# Patient Record
Sex: MALE | Race: WHITE | Employment: FULL TIME | URBAN - METROPOLITAN AREA
[De-identification: names, ages, dates, MRNs, and addresses within clinical notes are randomized per-mention and may not be internally consistent; named-entity substitution may affect disease eponyms.]

---

## 2017-06-15 PROBLEM — G30.9 ALZHEIMER'S DEMENTIA WITHOUT BEHAVIORAL DISTURBANCE (HCC): Status: ACTIVE | Noted: 2017-06-15

## 2017-06-15 PROBLEM — F02.80 ALZHEIMER'S DEMENTIA WITHOUT BEHAVIORAL DISTURBANCE (HCC): Status: ACTIVE | Noted: 2017-06-15

## 2018-04-24 PROBLEM — E11.21 TYPE 2 DIABETES WITH NEPHROPATHY (HCC): Status: ACTIVE | Noted: 2018-04-24

## 2018-05-07 PROBLEM — D51.0 PERNICIOUS ANEMIA: Status: ACTIVE | Noted: 2018-05-07

## 2018-05-07 PROBLEM — Z79.899 ENCOUNTER FOR MEDICATION MANAGEMENT: Status: ACTIVE | Noted: 2018-05-07

## 2018-05-07 PROBLEM — R26.9 GAIT DISORDER: Status: ACTIVE | Noted: 2018-05-07

## 2018-05-07 PROBLEM — G20 PARKINSONISM (HCC): Status: ACTIVE | Noted: 2018-05-07

## 2018-05-07 PROBLEM — R29.3 POSTURAL IMBALANCE: Status: ACTIVE | Noted: 2018-05-07

## 2018-05-07 PROBLEM — F02.818 EARLY ONSET ALZHEIMER'S DISEASE WITH BEHAVIORAL DISTURBANCE (HCC): Status: ACTIVE | Noted: 2018-05-07

## 2018-05-07 PROBLEM — G30.0 EARLY ONSET ALZHEIMER'S DISEASE WITH BEHAVIORAL DISTURBANCE (HCC): Status: ACTIVE | Noted: 2018-05-07

## 2018-05-16 PROBLEM — E53.8 VITAMIN B12 DEFICIENCY: Status: ACTIVE | Noted: 2018-05-16

## 2018-05-21 ENCOUNTER — HOSPITAL ENCOUNTER (OUTPATIENT)
Dept: CT IMAGING | Age: 78
Discharge: HOME | End: 2018-05-21
Attending: PSYCHIATRY & NEUROLOGY
Payer: MEDICARE

## 2018-05-21 DIAGNOSIS — F02.818 EARLY ONSET ALZHEIMER'S DISEASE WITH BEHAVIORAL DISTURBANCE (HCC): ICD-10-CM

## 2018-05-21 DIAGNOSIS — G30.0 EARLY ONSET ALZHEIMER'S DISEASE WITH BEHAVIORAL DISTURBANCE (HCC): ICD-10-CM

## 2018-05-21 PROCEDURE — 70450 CT HEAD/BRAIN W/O DYE: CPT

## 2018-05-25 ENCOUNTER — HOSPITAL ENCOUNTER (OUTPATIENT)
Dept: PHYSICAL THERAPY | Age: 78
Discharge: HOME | End: 2018-05-25
Attending: PSYCHIATRY & NEUROLOGY
Payer: MEDICARE

## 2018-05-25 DIAGNOSIS — R26.9 GAIT DISORDER: ICD-10-CM

## 2018-05-25 PROCEDURE — 97110 THERAPEUTIC EXERCISES: CPT

## 2018-05-25 PROCEDURE — G8978 MOBILITY CURRENT STATUS: HCPCS

## 2018-05-25 PROCEDURE — G8979 MOBILITY GOAL STATUS: HCPCS

## 2018-05-25 PROCEDURE — 97163 PT EVAL HIGH COMPLEX 45 MIN: CPT

## 2018-05-25 NOTE — PROGRESS NOTES
Ambulatory/Rehab Services H2 Model Falls Risk Assessment    Risk Factor Pts. ·   Confusion/Disorientation/Impulsivity  []    4 ·   Symptomatic Depression  []   2 ·   Altered Elimination  []   1 ·   Dizziness/Vertigo  []   1 ·   Gender (Male)  [x]   1 ·   Any administered antiepileptics (anticonvulsants):  []   2 ·   Any administered benzodiazepines:  []   1 ·   Visual Impairment (specify):  []   1 ·   Portable Oxygen Use  []   1 ·   Orthostatic ? BP  []   1 ·   History of Recent Falls (within 3 mos.)  [x]   5     Ability to Rise from Chair (choose one) Pts. ·   Ability to rise in a single movement  [x]   0 ·   Pushes up, successful in one attempt  []   1 ·   Multiple attempts, but successful  []   3 ·   Unable to rise without assistance  []   4   Total: (5 or greater = High Risk) 6     Falls Prevention Plan:   []                Physical Limitations to Exercise (specify):   []                Mobility Assistance Device (type):   [x]                Exercise/Equipment Adaptation (specify): Patient will be supervised in the clinic at all times due to higher fall risk. ©2010 [facility] . [facility] Patent #[id number].  Federal Law prohibits the replication, distribution or use without written permission from [facility] [employer]

## 2018-05-25 NOTE — THERAPY EVALUATION
Arnie Santiago  : 1940  [place]  Secondary:  [facility] [place] [address], [place] [address].  [phone]   [phone]        OUTPATIENT PHYSICAL [place] Assessment 2018    ICD-10: Treatment Diagnosis: Difficulty in walking, not elsewhere classified (R26.2)  Precautions/Allergies:   Seroquel [quetiapine]   Fall Risk Score: 6 (? 5 = High Risk)  MD Orders: Evaluate and treat  MEDICAL/REFERRING DIAGNOSIS:  Gait disorder [R26.9]   DATE OF ONSET: Chronic   REFERRING PHYSICIAN: Aj Mueller MD  RETURN PHYSICIAN APPOINTMENT: unknown      INITIAL ASSESSMENT:  Mr. Fara Kirkland presents with imbalance, impaired posture, and difficulty walking. Patient is at higher fall risk based on history of falls and score received on Ceron Balance Scale. Patient would benefit from skilled physical therapy to address problems and goals. Thank you for this referral.      PROBLEM LIST (Impacting functional limitations):  1. Decreased ADL/Functional Activities  2. Decreased Transfer Abilities  3. Decreased Ambulation Ability/Technique  4. Decreased Balance  5. Decreased Activity Tolerance  6. Decreased Flexibility/Joint Mobility  7. Decreased [place] with Home Exercise Program INTERVENTIONS PLANNED:  1. Balance Exercise  2. Bed Mobility  3. Family Education  4. Gait Training  5. Home Exercise Program (HEP)  6. Manual Therapy  7. Neuromuscular Re-education/Strengthening  8. Range of Motion (ROM)  9. Therapeutic Activites  10. Therapeutic Exercise/Strengthening   TREATMENT PLAN:  Effective Dates: 2018 TO 2018 (90 days). Frequency/Duration: 2 times a week for 90 Days  GOALS: (Goals have been discussed and agreed upon with patient.)  Short-Term Functional Goals: Time Frame: 30 days  1. Patient will be able to perform home exercise program with assistance from his family.     2. Patient will increase score on Ceron Balance Scale to greater than or equal to 43/56 demonstrating improved balance and decreased fall risk with daily activities. Discharge Goals: Time Frame: 90 days   1. Patient will increase score on Ceron Balance Scale to greater than or equal to 46/56 demonstrating improved balance and decreased fall risk with daily activities. 2. Patient with ambulate with least restrictive assistive device greater than or equal to 200 feet across level and unlevel surfaces without loss of balance. 3. Patient and his family will report improved stability and improved mobility with daily activities. Rehabilitation Potential For Stated Goals: Guarded  Regarding [facility] therapy, I certify that the treatment plan above will be carried out by a therapist or under their direction. Thank you for this referral,  Jarad Matias PT     Referring Physician Signature: Jj Mena MD              Date                    The information in this section was collected on 5/25/2018 (except where otherwise noted). HISTORY:   History of Present Injury/Illness (Reason for Referral):  Patient has had one fall over the past couple of months. Patient complains of imbalance and difficulty walking. Patient currently using no assistive. Patient does have a rollator walker he uses at home. Patient has difficulty getting in and out of the bed and up and down from the chair due to imbalance. Patient is worse in the mornings. Patient reports current pain level as 0/10; dizziness as 0/10. Past Medical History/Comorbidities:   Mr. Keren Green  has a past medical history of Type II or unspecified type diabetes mellitus without mention of complication, not stated as uncontrolled (8/19/2015) and Unspecified hypothyroidism (8/19/2015). Mr. Keren Green  has a past surgical history that includes hx cataract removal.  Social History/Living Environment:     Lives with daughter and son-in-law. Prior Level of Function/Work/Activity:  Needs assistance with ADLs. Patient needs twenty-four hour monitoring for safety. Daughter reports she has a bed alarm and bed monitor for the patient. Patient has travel wheelchair and rollator walker. Dominant Side:         RIGHT  Personal Factors:          Sex:  male        Age:  76 y.o. Current Medications:       Current Outpatient Prescriptions:     cyanocobalamin (VITAMIN B12) 1,000 mcg/mL injection, 1 ml IM q week x 4 then 1 ml IM q month. Disp with 10 x 3 ml syringe with 1\" 23 g needle, Disp: 10 Vial, Rfl: 12    melatonin tab tablet, Take 10 mg by mouth nightly., Disp: , Rfl:     rivastigmine tartrate (EXELON) 4.5 mg capsule, 3 per day for memory, Disp: 270 Cap, Rfl: 12    traZODone (DESYREL) 50 mg tablet, 2-3 qhs for sleep, Disp: 270 Tab, Rfl: 12    levothyroxine (SYNTHROID) 75 mcg tablet, 1 qd  Indications: hypothyroidism, Disp: 90 Tab, Rfl: 9    triamterene-hydroCHLOROthiazide (MAXZIDE) 37.5-25 mg per tablet, 1 qd  Indications: hypertension, Disp: 90 Tab, Rfl: 9    metFORMIN ER (GLUCOPHAGE XR) 500 mg tablet, 1 bid  Indications: type 2 diabetes mellitus, Disp: 180 Tab, Rfl: 9    simvastatin (ZOCOR) 40 mg tablet, Take 1 Tab by mouth nightly. Indications: mixed hyperlipidemia, Disp: 90 Tab, Rfl: 9    benazepril (LOTENSIN) 40 mg tablet, Take 1 Tab by mouth daily. Indications: hypertension, Disp: 90 Tab, Rfl: 9    Aspirin, Buffered 81 mg tab, Take  by mouth., Disp: , Rfl:    Date Last Reviewed:  5/25/2018   Number of Personal Factors/Comorbidities that affect the Plan of Care: 3+: HIGH COMPLEXITY   EXAMINATION:   Observation/Orthostatic Postural Assessment:          Significant forward head/rounded shoulders posture. Patient stands with weight shifted anteriorly with upper trunk in flexed forward position. Functional Mobility:         Gait/Ambulation:  Patient ambulates without assistive device demonstrating decreased gait speed with shuffling gait pattern. Patient walks with head looking down at the ground. Strength:          Bilateral lower extremity 5/5. Sensation:         Not tested. Postural Control & Balance:  · Ceron Balance Scale:  40/56.   (A score less than 45/56 indicates high risk of falls)     · Dynamic Gait Index:  Not tested. · mPura Sensory Organization Test:  Not tested. Body Structures Involved:  1. Eyes and Ears  2. Muscles Body Functions Affected:  1. Neuromusculoskeletal Activities and Participation Affected:  1. General Tasks and Demands  2. Mobility  3. Interpersonal Interactions and Relationships  4. Community, Social and [place]   Number of elements (examined above) that affect the Plan of Care: 4+: HIGH COMPLEXITY   CLINICAL PRESENTATION:   Presentation: Evolving clinical presentation with unstable and unpredictable characteristics: HIGH COMPLEXITY   CLINICAL DECISION MAKING:   Outcome Measure: Tool Used: Ceron Balance Scale  Score:  Initial: 40/56 Most Recent: X/56 (Date: -- )   Interpretation of Score: Each section is scored on a 0-4 scale, 0 representing the patients inability to perform the task and 4 representing independence. The scores of each section are added together for a total score of 56. The higher the patients score, the more independent the patient is. Any score below 45 indicates increased risk for falls. Score 56 55-45 44-34 33-23 22-12 11-1 0   Modifier CH CI CJ CK CL CM CN     ? Mobility - Walking and Moving Around:     - CURRENT STATUS: CJ - 20%-39% impaired, limited or restricted    - GOAL STATUS: CI - 1%-19% impaired, limited or restricted    - D/C STATUS:  ---------------To be determined---------------    Medical Necessity:   · Patient is expected to demonstrate progress in balance and posture to improve safety during activities of daily living. Reason for Services/Other Comments:  · Patient continues to require skilled intervention due to higher fall risk with daily activities.    Use of outcome tool(s) and clinical judgement create a POC that gives a: Difficult prediction of patient's progress: HIGH COMPLEXITY            TREATMENT:   (In addition to Assessment/Re-Assessment sessions the following treatments were rendered)  Pre-treatment Symptoms/Complaints:  Imbalance and difficulty walking   Pain: Initial:   Pain Intensity 1: 0  Post Session:  0     Initial Evaluation: 45 minutes  THERAPEUTIC EXERCISE: (15 minutes):  Exercises per grid below to improve mobility and strength. Required minimal verbal cues to promote proper body alignment. Progressed repetitions as indicated. Date:  5/25/18 Date:   Date:     Activity/Exercise Parameters Parameters Parameters   Cervical rotation X 5B     Cervical extension X 5B     Cervical lateral flexion X 5B     Scapular retraction X 5B     Seated hip flexion X 5B     Seated hip abduction Red theraband x 5B     Seated leg press Red theraband x 5B       Patient given home exercise program consisting of cervical exercises, seated lower extremity exercises, and balance exercises. [employer]  Treatment/Session Assessment:    · Response to Treatment:  Tolerated without complaints. · Compliance with Program/Exercises: Will assess as treatment progresses. · Recommendations/Intent for next treatment session: \"Next visit will focus on advancements to more challenging activities\".   Total Treatment Duration:  PT Patient Time In/Time Out  Time In: 1100  Time Out: [facility]

## 2018-06-15 ENCOUNTER — HOSPITAL ENCOUNTER (OUTPATIENT)
Dept: PHYSICAL THERAPY | Age: 78
Discharge: HOME | End: 2018-06-15
Attending: PSYCHIATRY & NEUROLOGY

## 2018-06-15 NOTE — PROGRESS NOTES
[facility] [address], [address] [place] [address], [address]  Phone: [phone]   Fax: [phone]    OUTPATIENT DAILY NOTE    NAME/AGE/GENDER: Mikie Russell is a 76 y.o. male. DATE: 6/15/2018    Patient cancelled his appointment for today due to illness. Will plan to follow up on next scheduled visit.     Nicole Zurita, PT

## 2018-06-26 ENCOUNTER — APPOINTMENT (OUTPATIENT)
Dept: PHYSICAL THERAPY | Age: 78
End: 2018-06-26
Attending: PSYCHIATRY & NEUROLOGY

## 2018-06-28 ENCOUNTER — APPOINTMENT (OUTPATIENT)
Dept: PHYSICAL THERAPY | Age: 78
End: 2018-06-28
Attending: PSYCHIATRY & NEUROLOGY